# Patient Record
Sex: FEMALE | Race: BLACK OR AFRICAN AMERICAN | NOT HISPANIC OR LATINO | Employment: FULL TIME | URBAN - METROPOLITAN AREA
[De-identification: names, ages, dates, MRNs, and addresses within clinical notes are randomized per-mention and may not be internally consistent; named-entity substitution may affect disease eponyms.]

---

## 2023-05-06 DIAGNOSIS — I10 PRIMARY HYPERTENSION: Primary | ICD-10-CM

## 2023-05-10 RX ORDER — AMLODIPINE BESYLATE 5 MG/1
5 TABLET ORAL DAILY
Qty: 30 TABLET | Refills: 0 | Status: SHIPPED | OUTPATIENT
Start: 2023-05-10 | End: 2023-06-09

## 2025-05-12 ENCOUNTER — HOSPITAL ENCOUNTER (EMERGENCY)
Facility: HOSPITAL | Age: 35
Discharge: HOME | End: 2025-05-12
Attending: STUDENT IN AN ORGANIZED HEALTH CARE EDUCATION/TRAINING PROGRAM
Payer: MEDICARE

## 2025-05-12 ENCOUNTER — APPOINTMENT (OUTPATIENT)
Dept: RADIOLOGY | Facility: HOSPITAL | Age: 35
End: 2025-05-12
Payer: MEDICARE

## 2025-05-12 ENCOUNTER — CLINICAL SUPPORT (OUTPATIENT)
Dept: EMERGENCY MEDICINE | Facility: HOSPITAL | Age: 35
End: 2025-05-12
Payer: MEDICARE

## 2025-05-12 VITALS
HEART RATE: 85 BPM | OXYGEN SATURATION: 99 % | TEMPERATURE: 97.5 F | DIASTOLIC BLOOD PRESSURE: 108 MMHG | RESPIRATION RATE: 16 BRPM | WEIGHT: 185 LBS | SYSTOLIC BLOOD PRESSURE: 189 MMHG | HEIGHT: 67 IN | BODY MASS INDEX: 29.03 KG/M2

## 2025-05-12 DIAGNOSIS — R94.31 T WAVE INVERSION IN EKG: ICD-10-CM

## 2025-05-12 DIAGNOSIS — J06.9 VIRAL UPPER RESPIRATORY TRACT INFECTION: Primary | ICD-10-CM

## 2025-05-12 LAB
ALBUMIN SERPL BCP-MCNC: 3.9 G/DL (ref 3.4–5)
ALP SERPL-CCNC: 51 U/L (ref 33–110)
ALT SERPL W P-5'-P-CCNC: 25 U/L (ref 7–45)
ANION GAP SERPL CALC-SCNC: 13 MMOL/L (ref 10–20)
AST SERPL W P-5'-P-CCNC: 18 U/L (ref 9–39)
ATRIAL RATE: 126 BPM
BASOPHILS # BLD AUTO: 0.04 X10*3/UL (ref 0–0.1)
BASOPHILS NFR BLD AUTO: 0.6 %
BILIRUB SERPL-MCNC: 0.3 MG/DL (ref 0–1.2)
BUN SERPL-MCNC: 12 MG/DL (ref 6–23)
CALCIUM SERPL-MCNC: 9.5 MG/DL (ref 8.6–10.6)
CARDIAC TROPONIN I PNL SERPL HS: <3 NG/L (ref 0–34)
CHLORIDE SERPL-SCNC: 109 MMOL/L (ref 98–107)
CO2 SERPL-SCNC: 23 MMOL/L (ref 21–32)
CREAT SERPL-MCNC: 0.83 MG/DL (ref 0.5–1.05)
EGFRCR SERPLBLD CKD-EPI 2021: >90 ML/MIN/1.73M*2
EOSINOPHIL # BLD AUTO: 0.02 X10*3/UL (ref 0–0.7)
EOSINOPHIL NFR BLD AUTO: 0.3 %
ERYTHROCYTE [DISTWIDTH] IN BLOOD BY AUTOMATED COUNT: 14 % (ref 11.5–14.5)
FLUAV RNA RESP QL NAA+PROBE: NOT DETECTED
FLUBV RNA RESP QL NAA+PROBE: NOT DETECTED
GLUCOSE SERPL-MCNC: 101 MG/DL (ref 74–99)
HCT VFR BLD AUTO: 39.8 % (ref 36–46)
HGB BLD-MCNC: 13 G/DL (ref 12–16)
IMM GRANULOCYTES # BLD AUTO: 0.03 X10*3/UL (ref 0–0.7)
IMM GRANULOCYTES NFR BLD AUTO: 0.4 % (ref 0–0.9)
LYMPHOCYTES # BLD AUTO: 1.7 X10*3/UL (ref 1.2–4.8)
LYMPHOCYTES NFR BLD AUTO: 24.1 %
MAGNESIUM SERPL-MCNC: 1.77 MG/DL (ref 1.6–2.4)
MCH RBC QN AUTO: 26.6 PG (ref 26–34)
MCHC RBC AUTO-ENTMCNC: 32.7 G/DL (ref 32–36)
MCV RBC AUTO: 81 FL (ref 80–100)
MONOCYTES # BLD AUTO: 0.75 X10*3/UL (ref 0.1–1)
MONOCYTES NFR BLD AUTO: 10.6 %
NEUTROPHILS # BLD AUTO: 4.52 X10*3/UL (ref 1.2–7.7)
NEUTROPHILS NFR BLD AUTO: 64 %
NRBC BLD-RTO: 0 /100 WBCS (ref 0–0)
P AXIS: 59 DEGREES
P OFFSET: 187 MS
P ONSET: 167 MS
PLATELET # BLD AUTO: 247 X10*3/UL (ref 150–450)
POTASSIUM SERPL-SCNC: 3.6 MMOL/L (ref 3.5–5.3)
PR INTERVAL: 122 MS
PROT SERPL-MCNC: 7.2 G/DL (ref 6.4–8.2)
Q ONSET: 218 MS
QRS COUNT: 21 BEATS
QRS DURATION: 76 MS
QT INTERVAL: 304 MS
QTC CALCULATION(BAZETT): 440 MS
QTC FREDERICIA: 389 MS
R AXIS: 73 DEGREES
RBC # BLD AUTO: 4.89 X10*6/UL (ref 4–5.2)
SARS-COV-2 RNA RESP QL NAA+PROBE: NOT DETECTED
SODIUM SERPL-SCNC: 141 MMOL/L (ref 136–145)
T AXIS: -24 DEGREES
T OFFSET: 370 MS
VENTRICULAR RATE: 126 BPM
WBC # BLD AUTO: 7.1 X10*3/UL (ref 4.4–11.3)

## 2025-05-12 PROCEDURE — 36415 COLL VENOUS BLD VENIPUNCTURE: CPT | Performed by: STUDENT IN AN ORGANIZED HEALTH CARE EDUCATION/TRAINING PROGRAM

## 2025-05-12 PROCEDURE — 96360 HYDRATION IV INFUSION INIT: CPT

## 2025-05-12 PROCEDURE — 96361 HYDRATE IV INFUSION ADD-ON: CPT

## 2025-05-12 PROCEDURE — 80053 COMPREHEN METABOLIC PANEL: CPT | Performed by: STUDENT IN AN ORGANIZED HEALTH CARE EDUCATION/TRAINING PROGRAM

## 2025-05-12 PROCEDURE — 83735 ASSAY OF MAGNESIUM: CPT

## 2025-05-12 PROCEDURE — 87636 SARSCOV2 & INF A&B AMP PRB: CPT

## 2025-05-12 PROCEDURE — 71046 X-RAY EXAM CHEST 2 VIEWS: CPT | Mod: FOREIGN READ | Performed by: STUDENT IN AN ORGANIZED HEALTH CARE EDUCATION/TRAINING PROGRAM

## 2025-05-12 PROCEDURE — 84484 ASSAY OF TROPONIN QUANT: CPT | Performed by: STUDENT IN AN ORGANIZED HEALTH CARE EDUCATION/TRAINING PROGRAM

## 2025-05-12 PROCEDURE — 85025 COMPLETE CBC W/AUTO DIFF WBC: CPT | Performed by: STUDENT IN AN ORGANIZED HEALTH CARE EDUCATION/TRAINING PROGRAM

## 2025-05-12 PROCEDURE — 71046 X-RAY EXAM CHEST 2 VIEWS: CPT

## 2025-05-12 PROCEDURE — 99285 EMERGENCY DEPT VISIT HI MDM: CPT | Mod: 25 | Performed by: STUDENT IN AN ORGANIZED HEALTH CARE EDUCATION/TRAINING PROGRAM

## 2025-05-12 PROCEDURE — 93005 ELECTROCARDIOGRAM TRACING: CPT

## 2025-05-12 PROCEDURE — 2500000004 HC RX 250 GENERAL PHARMACY W/ HCPCS (ALT 636 FOR OP/ED): Mod: JZ

## 2025-05-12 PROCEDURE — 2500000001 HC RX 250 WO HCPCS SELF ADMINISTERED DRUGS (ALT 637 FOR MEDICARE OP): Performed by: STUDENT IN AN ORGANIZED HEALTH CARE EDUCATION/TRAINING PROGRAM

## 2025-05-12 RX ORDER — ACETAMINOPHEN 325 MG/1
975 TABLET ORAL ONCE
Status: COMPLETED | OUTPATIENT
Start: 2025-05-12 | End: 2025-05-12

## 2025-05-12 RX ORDER — IBUPROFEN 600 MG/1
600 TABLET ORAL EVERY 6 HOURS PRN
Qty: 20 TABLET | Refills: 0 | Status: SHIPPED | OUTPATIENT
Start: 2025-05-12 | End: 2025-05-19

## 2025-05-12 RX ORDER — ACETAMINOPHEN 500 MG
1000 TABLET ORAL EVERY 6 HOURS PRN
Qty: 30 TABLET | Refills: 0 | Status: SHIPPED | OUTPATIENT
Start: 2025-05-12 | End: 2025-05-22

## 2025-05-12 RX ADMIN — SODIUM CHLORIDE 500 ML: 0.9 INJECTION, SOLUTION INTRAVENOUS at 11:06

## 2025-05-12 RX ADMIN — ACETAMINOPHEN 975 MG: 325 TABLET ORAL at 12:12

## 2025-05-12 ASSESSMENT — COLUMBIA-SUICIDE SEVERITY RATING SCALE - C-SSRS
1. IN THE PAST MONTH, HAVE YOU WISHED YOU WERE DEAD OR WISHED YOU COULD GO TO SLEEP AND NOT WAKE UP?: NO
6. HAVE YOU EVER DONE ANYTHING, STARTED TO DO ANYTHING, OR PREPARED TO DO ANYTHING TO END YOUR LIFE?: NO
2. HAVE YOU ACTUALLY HAD ANY THOUGHTS OF KILLING YOURSELF?: NO

## 2025-05-12 ASSESSMENT — PAIN SCALES - GENERAL
PAINLEVEL_OUTOF10: 0 - NO PAIN

## 2025-05-12 ASSESSMENT — PAIN - FUNCTIONAL ASSESSMENT
PAIN_FUNCTIONAL_ASSESSMENT: 0-10
PAIN_FUNCTIONAL_ASSESSMENT: 0-10

## 2025-05-12 ASSESSMENT — LIFESTYLE VARIABLES
TOTAL SCORE: 0
EVER HAD A DRINK FIRST THING IN THE MORNING TO STEADY YOUR NERVES TO GET RID OF A HANGOVER: NO
EVER FELT BAD OR GUILTY ABOUT YOUR DRINKING: NO
HAVE YOU EVER FELT YOU SHOULD CUT DOWN ON YOUR DRINKING: NO
HAVE PEOPLE ANNOYED YOU BY CRITICIZING YOUR DRINKING: NO

## 2025-05-12 NOTE — ED PROVIDER NOTES
EMERGENCY DEPARTMENT ENCOUNTER      Pt Name: Frantz Ann  MRN: [id number]  Birthdate 1990  Date of evaluation: 5/12/2025  Provider: Akila Tanner MD    CHIEF COMPLAINT       Chief Complaint   Patient presents with    URI     HISTORY OF PRESENT ILLNESS    Frantz Ann is a 35 y.o. year old female who presents to the ER for URI symptoms.  The patient reports that she has had rhinorrhea and a cough for the past 3 days.  She reports that whenever she takes a deep breath, she coughs and she feels a rattle in her chest.  The patient also reports that she has had diarrhea for the past 3 days as well and she has had approximately 3 episodes per day.  She reports the stool as nonbloody and nonbilious.  She denies any pain.  She denies UTI symptoms.  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    PMH is significant for hypertension.     PAST MEDICAL HISTORY   Medical History[1]  CURRENT MEDICATIONS       Previous Medications    AMLODIPINE (NORVASC) 5 MG TABLET    Take 1 tablet (5 mg) by mouth once daily. as directed     SURGICAL HISTORY     Surgical History[2]  ALLERGIES     Patient has no known allergies.  FAMILY HISTORY     Family History[3]  SOCIAL HISTORY     Social History[4]  PHYSICAL EXAM  (up to 7 for level 4, 8 or more for level 5)     ED Triage Vitals [05/12/25 1002]   Temperature Heart Rate Respirations BP   36.4 °C (97.5 °F) (!) 132 16 (!) 189/112      Pulse Ox Temp src Heart Rate Source Patient Position   99 % -- -- --      BP Location FiO2 (%)     -- --       Physical Exam  Constitutional:       General: She is not in acute distress.     Appearance: She is obese.   HENT:      Head: Normocephalic and atraumatic.      Nose: Nose normal.      Mouth/Throat:      Mouth: Mucous membranes are moist.   Eyes:      Extraocular Movements: Extraocular movements intact.      Conjunctiva/sclera: Conjunctivae normal.   Cardiovascular:      Rate and Rhythm: Regular  rhythm. Tachycardia present.      Pulses: Normal pulses.      Heart sounds: Normal heart sounds.   Pulmonary:      Effort: Pulmonary effort is normal.      Breath sounds: Normal breath sounds.   Abdominal:      General: Abdomen is flat.      Palpations: Abdomen is soft.      Tenderness: There is no abdominal tenderness. There is no guarding or rebound.      Hernia: No hernia is present.   Musculoskeletal:      Cervical back: Normal range of motion and neck supple.      Right lower leg: No edema.      Left lower leg: No edema.   Skin:     General: Skin is warm and dry.      Capillary Refill: Capillary refill takes less than 2 seconds.   Neurological:      General: No focal deficit present.        DIAGNOSTIC RESULTS   LABS:  Labs Reviewed   COMPREHENSIVE METABOLIC PANEL - Abnormal       Result Value    Glucose 101 (*)     Sodium 141      Potassium 3.6      Chloride 109 (*)     Bicarbonate 23      Anion Gap 13      Urea Nitrogen 12      Creatinine 0.83      eGFR >90      Calcium 9.5      Albumin 3.9      Alkaline Phosphatase 51      Total Protein 7.2      AST 18      Bilirubin, Total 0.3      ALT 25     TROPONIN I, HIGH SENSITIVITY - Normal    Troponin I, High Sensitivity (CMC) <3      Narrative:     Less than 99th percentile of normal range cutoff-  Female and children under 18 years old <35 ng/L; Male <54 ng/L: Negative  Repeat testing should be performed if clinically indicated.     Female and children under 18 years old  ng/L; Male  ng/L:  Consistent with possible cardiac damage and possible increased clinical   risk. Serial measurements may help to assess extent of myocardial damage.     >120 ng/L: Consistent with cardiac damage, increased clinical risk and  myocardial infarction. Serial measurements may help assess extent of   myocardial damage.      NOTE: Children less than 1 year old may have higher baseline troponin   levels and results should be interpreted in conjunction with the overall    clinical context.    NOTE: Troponin I testing is performed using a different   testing methodology at [facility] than at other   [facility]. Direct result comparisons should only   be made within the same method.     MAGNESIUM - Normal    Magnesium 1.77     INFLUENZA A AND B PCR - Normal    Flu A Result Not Detected      Flu B Result Not Detected      Narrative:     This assay is an in vitro diagnostic multiplex nucleic acid amplification test for the detection and discrimination of Influenza A & B from nasopharyngeal specimens, and has been validated for use at [facility]. Negative results do not preclude Influenza A/B infections, and should not be used as the sole basis for diagnosis, treatment, or other management decisions. If Influenza A/B and RSV PCR results are negative, testing for Parainfluenza virus, Adenovirus and Metapneumovirus is routinely performed for [facility] [place] and [place] inpatients, and is available on other patients by placing an add-on request.   SARS-COV-2 PCR - Normal    Coronavirus 2019, PCR Not Detected      Narrative:     This assay is an FDA-cleared, in vitro diagnostic nucleic acid amplification test for the qualitative detection and differentiation of SARS CoV-2 from nasopharyngeal specimens collected from individuals with signs and symptoms of respiratory tract infections, and has been validated for use at [facility]. Negative results do not preclude COVID-19 infections and should not be used as the sole basis for diagnosis, treatment, or other management decisions. Testing for SARS CoV-2 is recommended only for patients who meet current clinical and/or epidemiological criteria defined by federal, state, or local public health directives.   CBC WITH AUTO DIFFERENTIAL    WBC 7.1      nRBC 0.0      RBC 4.89      Hemoglobin 13.0      Hematocrit 39.8      MCV 81      MCH 26.6      MCHC 32.7      RDW  "14.0      Platelets 247      Neutrophils % 64.0      Immature Granulocytes %, Automated 0.4      Lymphocytes % 24.1      Monocytes % 10.6      Eosinophils % 0.3      Basophils % 0.6      Neutrophils Absolute 4.52      Immature Granulocytes Absolute, Automated 0.03      Lymphocytes Absolute 1.70      Monocytes Absolute 0.75      Eosinophils Absolute 0.02      Basophils Absolute 0.04     HUMAN CHORIONIC GONADOTROPIN, SERUM QUANTITATIVE     All other labs were within normal range or not returned as of this dictation.  Imaging  XR chest 2 views   Final Result   No radiographic evidence of acute cardiopulmonary disease.   Signed by Pierre Casillas         Procedure  Procedures  EMERGENCY DEPARTMENT COURSE/MDM:   Medical Decision Making    Vitals:    Vitals:    05/12/25 1002 05/12/25 1131   BP: (!) 189/112 (!) 172/120   Pulse: (!) 132 94   Resp: 16 16   Temp: 36.4 °C (97.5 °F)    SpO2: 99% 100%   Weight: 83.9 kg (185 lb)    Height: 1.702 m (5' 7\")      Frantz Ann is a female 35 y.o. who presents to the ER for URI symptoms. On arrival the patients vital signs were: Afebrile, Tachycardic, Normotensive, and Normoxic on room air. History obtained from: patient.     Physical exam was unremarkable.  The lungs are clear to auscultation, no rales, crackles, wheezing.  On deep inspiration, the patient has a dry cough.  Heart is tachycardic but in regular rhythm.  Pulses are 2+ in the extremities, the abdomen is soft nontender.  No edema in the lower extremities.     On independent assessment of the labs, CBC was within normal limits, no evidence of infection or acute anemia.  CMP was within normal limits, no evidence of electrolyte abnormalities, DASH, or liver dysfunction.  Urinalysis was negative for UTI.  Viral PCR are negative.     Chest x-ray was negative for any acute cardiopulmonary processes.  Due to her increased heart rate, the patient was given a bolus of normal saline.    EKG showed new T wave inversions in V3.  " Patient is in sinus rhythm, NE intervals within normal limits, QT interval is normal.  QRS is narrow.  No ST elevations or depressions.  On repeat EKG, it still shows the T wave inversions in V3.  The rest of the EKG is similar to prior.    The patient's heart score is 1. The patient was discharged with and given return precautions. The patient was instructed to follow up with cardiology and with their PCP in one week. The patient understood and was agreeable with the plan.       ED Course as of 05/12/25 1324   Mon May 12, 2025   1053 External record review  4/20/25, 12/23/2024 : seen at [facility] for medroxyprogesterone  [FN]   1115 We considered CTA/Dimer to evaluate for PE however the patient's history and physical exam are not consistent with pulmonary embolism given no unilateral leg swelling/pain, hemoptysis, recent immobilization, active cancer.  Additionally there was an alternate more likely explanation for the patient's symptoms (URI/infection). Therefore additional testing for this diagnosis was not pursued due to its low clinical utility.   [AD]   1201 Senior resident attestation note:    35-year-old female with prior history of hypertension presenting to emergency department with upper respiratory symptoms, and nonproductive cough since Friday.  She currently works at a  with other children and employees who are sick, and believes she may have something similar.  Her COVID, influenza, and RSV swabs were all unremarkable.  Her lab work did not show any acute findings.  She notes decreased p.o. intake, likely the cause of patient's tachycardia today.  Improved with IV fluids.  EKG grossly unremarkable without any acute ischemic findings.  She is not having any chest pain or shortness of breath.  Have low suspicion for pulmonary embolism given lack of tachycardia, shortness of breath, hypoxia, or any known risk factors such as oral birth control, pregnancy, or history of DVT/PE.  Will plan  to repeat EKG given new T waves in the inferior leads.  If resolved, will plan to discharge patient home with Tylenol and ibuprofen.    Octavio Gan DO  [facility]   [AD]      ED Course User Index  [AD] Octavio Gan DO  [FN] Rhonda Villegas MD         Diagnoses as of 05/12/25 1324   Viral upper respiratory tract infection   T wave inversion in EKG       External Records Reviewed: I reviewed recent and relevant outside records including inpatient notes, outpatient records      Shared decision making for disposition  Patient and/or patient´s representative was counseled regarding labs, imaging, likely diagnosis. All questions were answered. Recommendation was made   for discharge home. The patient agreed and was discharged home in stable condition with appropriate relevant educational materials. Return precautions were provided which included new or worsening chest pain, shortness of breath, fever of 38C (100.4) or higher, persistent vomiting, weakness, numbness, tingling, excessive sweating,  loss of motion in your arms or legs, fainting, vision changes, or any new or worsening symptoms..     ED Medications administered this visit:    Medications   sodium chloride 0.9 % bolus 500 mL (0 mL intravenous Stopped 5/12/25 1307)   acetaminophen (Tylenol) tablet 975 mg (975 mg oral Given 5/12/25 1212)       New Prescriptions from this visit:    New Prescriptions    ACETAMINOPHEN (TYLENOL) 500 MG TABLET    Take 2 tablets (1,000 mg) by mouth every 6 hours if needed for mild pain (1 - 3) for up to 10 days.    IBUPROFEN 600 MG TABLET    Take 1 tablet (600 mg) by mouth every 6 hours if needed for mild pain (1 - 3) for up to 7 days.       Follow-up:  Neela Ambriz MD  [address]  [facility], [facility] [address]  [facility] [address] [phone]    In 1 week          Final Impression:   1. Viral upper respiratory tract infection    2. T  wave inversion in EKG          Please excuse any misspellings or unintended errors related to the Dragon speech recognition software used to dictate this note.    I reviewed the case with the attending ED physician. The attending ED physician agrees with the plan.        Akila Tanner MD  Resident  [phone]       [1]   Past Medical History:  Diagnosis Date    Maternal care for (suspected) hereditary disease in fetus, not applicable or unspecified ([facility]) 2019    Hereditary disease in family possibly affecting fetus    Maternal care for unspecified type scar from previous  delivery ([facility]) 2019    Uterine scar from previous  delivery affecting pregnancy    Morbid (severe) obesity due to excess calories ([facility]) 2019    Obesity, Class III, BMI 40-49.9 (morbid obesity)    Other abnormal glucose 2019    Elevated glucose tolerance test    Personal history of other diseases of the circulatory system 2016    History of hypertension    Unspecified pre-existing hypertension complicating pregnancy, unspecified trimester ([facility]) 2019    HTN in pregnancy, chronic   [2]   Past Surgical History:  Procedure Laterality Date     SECTION, CLASSIC  2016     Section    OTHER SURGICAL HISTORY  2015    Laparoscopic Cholecystectomy With Cholangiography   [3] No family history on file.  [4]   Social History  Tobacco Use    Smoking status: Not on file    Smokeless tobacco: Not on file   Substance Use Topics    Alcohol use: Not on file    Drug use: Not on file        Akila Tanner MD  Resident  [phone]

## 2025-05-12 NOTE — DISCHARGE INSTRUCTIONS
You are seen in the ED for your cold symptoms, your labs are normal, your chest x-ray was, no pneumonia.  Please stay hydrated with Gatorade, take cold and flu medication for your symptoms, Tylenol for fevers and ibuprofen for any kind of muscle pain.    Please return to the ED if you do not get better in 1 week, if you have worsening symptoms, if you cannot swallow, if you have dizziness that does not go away, fainting spells, confusions, fevers greater than 101 °F for more than 24 hours that does not get better with Tylenol or Motrin, and if you have any other concerns.    Please make an appointment with the cardiologist when they reach out to you to follow-up your high blood pressure and your abnormal EKG.

## 2025-05-12 NOTE — Clinical Note
Frantz Ann was seen and treated in our emergency department on 5/12/2025.  She may return to work on 05/14/2025.       If you have any questions or concerns, please don't hesitate to call.      Akila Tanner MD

## 2025-05-28 PROBLEM — N92.1 BREAKTHROUGH BLEEDING ON DEPO-PROVERA: Status: ACTIVE | Noted: 2025-05-28

## 2025-05-28 PROBLEM — D56.3 ALPHA THALASSAEMIA MINOR: Status: ACTIVE | Noted: 2025-05-28

## 2025-05-28 PROBLEM — I10 ESSENTIAL HYPERTENSION: Status: ACTIVE | Noted: 2025-05-28

## 2025-05-28 PROBLEM — E55.9 VITAMIN D DEFICIENCY: Status: ACTIVE | Noted: 2025-05-28

## 2025-06-14 ENCOUNTER — APPOINTMENT (OUTPATIENT)
Dept: RADIOLOGY | Facility: HOSPITAL | Age: 35
End: 2025-06-14
Payer: COMMERCIAL

## 2025-06-14 ENCOUNTER — HOSPITAL ENCOUNTER (EMERGENCY)
Facility: HOSPITAL | Age: 35
Discharge: HOME | End: 2025-06-14
Attending: EMERGENCY MEDICINE
Payer: COMMERCIAL

## 2025-06-14 VITALS
TEMPERATURE: 98 F | HEART RATE: 94 BPM | WEIGHT: 240 LBS | BODY MASS INDEX: 37.67 KG/M2 | RESPIRATION RATE: 18 BRPM | HEIGHT: 67 IN | SYSTOLIC BLOOD PRESSURE: 199 MMHG | OXYGEN SATURATION: 100 % | DIASTOLIC BLOOD PRESSURE: 119 MMHG

## 2025-06-14 DIAGNOSIS — M25.562 ACUTE PAIN OF LEFT KNEE: Primary | ICD-10-CM

## 2025-06-14 DIAGNOSIS — I10 HTN (HYPERTENSION), BENIGN: ICD-10-CM

## 2025-06-14 PROCEDURE — 99283 EMERGENCY DEPT VISIT LOW MDM: CPT | Performed by: EMERGENCY MEDICINE

## 2025-06-14 PROCEDURE — 73564 X-RAY EXAM KNEE 4 OR MORE: CPT | Mod: LT

## 2025-06-14 PROCEDURE — 73564 X-RAY EXAM KNEE 4 OR MORE: CPT | Mod: LEFT SIDE | Performed by: RADIOLOGY

## 2025-06-14 RX ORDER — ACETAMINOPHEN 500 MG
1000 TABLET ORAL EVERY 6 HOURS PRN
Qty: 30 TABLET | Refills: 0 | Status: SHIPPED | OUTPATIENT
Start: 2025-06-14 | End: 2025-06-24

## 2025-06-14 RX ORDER — IBUPROFEN 600 MG/1
600 TABLET, FILM COATED ORAL EVERY 6 HOURS PRN
Qty: 40 TABLET | Refills: 0 | Status: SHIPPED | OUTPATIENT
Start: 2025-06-14 | End: 2025-06-24

## 2025-06-14 RX ORDER — BLOOD PRESSURE TEST KIT-MEDIUM
1 KIT MISCELLANEOUS ONCE
Qty: 1 KIT | Refills: 0 | Status: SHIPPED | OUTPATIENT
Start: 2025-06-14 | End: 2025-06-14

## 2025-06-14 RX ORDER — KETOROLAC TROMETHAMINE 30 MG/ML
15 INJECTION, SOLUTION INTRAMUSCULAR; INTRAVENOUS ONCE
Status: DISCONTINUED | OUTPATIENT
Start: 2025-06-14 | End: 2025-06-14 | Stop reason: HOSPADM

## 2025-06-14 ASSESSMENT — PAIN DESCRIPTION - ORIENTATION: ORIENTATION: LEFT

## 2025-06-14 ASSESSMENT — PAIN SCALES - GENERAL: PAINLEVEL_OUTOF10: 10 - WORST POSSIBLE PAIN

## 2025-06-14 ASSESSMENT — PAIN DESCRIPTION - FREQUENCY: FREQUENCY: CONSTANT/CONTINUOUS

## 2025-06-14 ASSESSMENT — PAIN DESCRIPTION - LOCATION: LOCATION: KNEE

## 2025-06-14 ASSESSMENT — PAIN DESCRIPTION - DESCRIPTORS
DESCRIPTORS: ACHING
DESCRIPTORS: ACHING

## 2025-06-14 ASSESSMENT — PAIN DESCRIPTION - PROGRESSION: CLINICAL_PROGRESSION: NOT CHANGED

## 2025-06-14 ASSESSMENT — PAIN - FUNCTIONAL ASSESSMENT: PAIN_FUNCTIONAL_ASSESSMENT: 0-10

## 2025-06-14 ASSESSMENT — PAIN DESCRIPTION - PAIN TYPE: TYPE: ACUTE PAIN

## 2025-06-14 ASSESSMENT — PAIN DESCRIPTION - ONSET: ONSET: SUDDEN

## 2025-06-14 NOTE — ED TRIAGE NOTES
Pt states she was dancing at 22:00 last night and heard pop in left knee and now having pain. No falls or noticeable trauma. No swelling or redness noted.

## 2025-06-14 NOTE — ED PROVIDER NOTES
History of Present Illness   History provided by: Patient  Limitations to History: None  External Records Reviewed with Brief Summary: None    HPI:  Frantz Ann is a 35 y.o. female with a past medical history of hypertension that presents to the emergency department today for left knee pain.  The patient states that she was dancing last night with her kids and heard a pop in her left knee.  She did not have any significant pain at that time but woke this morning and had difficulty ambulating on her left leg secondary to the pain.  She denies any significant swelling or overlying redness and has not had any fevers associated with this.  She does have a history of hypertension but did not take her amlodipine this morning but has been taking this as prescribed.  She states that her blood pressure has normally been well-controlled.  She denies any headaches, vision changes, difficulty with urination or any other concerning symptoms regarding her hypertension.    Physical Exam   Triage vitals:  T 36.7 °C (98 °F)  HR 94  BP (!) 199/119  RR 18  O2 100 % None (Room air)    Vital signs reviewed in nursing triage note, EMR flow sheets, and at patient's bedside.   Constitutional: Well developed adult in no acute distress, non toxic in appearance  Head: Normocephalic, atraumatic  Skin: Intact. No rashes or lesions.  Eyes: No conjunctival injection, scleral icterus, or drainage.  Neck: Supple. Trachea is midline.  Pulmonary: Normal work of breathing without signs of respiratory distress.  Extremities: No gross deformities. Moving all extremities spontaneously without difficulty.  Full range of motion of the left knee but does have pain at the end of extension.  No tenderness to palpation of the left knee.  No significant swelling appreciated.  No overlying erythema or warmth.  Neuro: Awake and alert. Face is symmetric.  Speech is clear. No obvious focal findings.  Psych: Appropriate affect    Medical Decision Making &  ED Course   Medical Decision Makin y.o. female with the above-stated past medical history that presents to the emergency department for left knee pain.  Upon arrival to the emergency department the patient was hypertensive with blood pressure 199/119 but had otherwise stable vital signs, is afebrile and saturating well on room air.  History and physical exam are as above but notable for nontoxic-appearing patient in no acute distress.  She does have full range of motion of the left knee with mild pain at the end of full extension.  There is no tenderness to palpation no overlying erythema or warmth and have low suspicion for septic arthritis.  Her symptoms are most likely secondary to a left knee sprain but will obtain an x-ray of the left knee to rule out underlying bony fracture or significant effusion although I have low suspicion for this.  Will treat her with a dose of Toradol and reassess her symptoms.  In regards to her hypertension, she did not take her amlodipine this morning and she is not having any symptoms concerning for hypertensive emergency and will not treat this as this could be secondary to pain and her missed medication.  She does have a follow-up with her cardiologist on  and requested her to recheck her blood pressure daily after taking her medication to ensure that she is on the proper dosing between then or now and follow-up with her cardiologist on this.  X-ray of the left knee did not show any evidence of acute fracture or other concerning bony abnormalities and the patient feels comfortable with discharging home.  She was provided with an Ace wrap for her knee and a prescription for Tylenol and ibuprofen which has been sent to her pharmacy.  She is provided with strict return precautions including fevers, erythema, significant knee swelling, inability to ambulate on the knee or any other concerning symptoms.  She verbalized her understanding of this and was discharged home in  stable condition.    ED Course:  ED Course as of 06/14/25 1004   Sat Jun 14, 2025   0931 XR knee left 4+ views  Left knee x-ray personally interpreted by me showing no acute fracture, joint space loss or significant effusion.  Normal left knee x-ray. [RS]      ED Course User Index  [RS] Derrick Huang DO         Diagnoses as of 06/14/25 1004   Acute pain of left knee   HTN (hypertension), benign        Social Determinants of Health which Significantly Impact Care: None identified     EKG Independent Interpretation: EKG not obtained    Independent Result Review and Interpretation: Relevant laboratory and radiographic results were reviewed and independently interpreted by myself.  As necessary, they are commented on in the ED Course.    Chronic conditions affecting the patient's care: As documented in the HPI    The patient was discussed with the following consultants/services: None    Care Considerations: As documented above in MDM    Disposition   As a result of the work-up, the patient was discharged home.  she was informed of her diagnosis and instructed to come back with any concerns or worsening of condition.  she and was agreeable to the plan as discussed above.  she was given the opportunity to ask questions.  All of the patient's questions were answered.    Procedures   Procedures    Patient seen and discussed with ED attending physician.    Derrick Huang DO   Emergency Medicine, PGY-2     Disclaimer: This note was dictated by speech recognition. Minor errors in transcription may be present.     [unfilled] ? [employer] last updated 6/14/2025 8:56 AM        Derrick Huang DO  Resident  06/14/25 1004

## 2025-06-14 NOTE — DISCHARGE INSTRUCTIONS
You were seen in the emergency department today for left knee pain.  Your x-ray did not show any findings of fracture or other concerning bony abnormalities.  This is likely a knee sprain and you can take ibuprofen and Tylenol as needed for your pain.  A referral for sports medicine has been placed for you and you can follow-up with them as needed for further management of your knee pain.  Your blood pressure was also elevated here in the ER and you have a scheduled follow-up with your cardiologist on the 19th and you can discuss any changes in your medication but are encouraged to take your amlodipine when you get home today.  Please return to the emergency department if you have significant swelling of your knee, redness, fevers or unable to walk.  You can use an Ace wrap to help support your knee at home and one will be provided for you today.

## 2025-06-19 ENCOUNTER — APPOINTMENT (OUTPATIENT)
Dept: CARDIOLOGY | Facility: CLINIC | Age: 35
End: 2025-06-19
Payer: MEDICARE

## 2025-06-19 VITALS
OXYGEN SATURATION: 100 % | BODY MASS INDEX: 47.46 KG/M2 | HEART RATE: 91 BPM | WEIGHT: 293 LBS | DIASTOLIC BLOOD PRESSURE: 88 MMHG | SYSTOLIC BLOOD PRESSURE: 156 MMHG

## 2025-06-19 DIAGNOSIS — I10 ESSENTIAL HYPERTENSION: Primary | ICD-10-CM

## 2025-06-19 DIAGNOSIS — J06.9 VIRAL UPPER RESPIRATORY TRACT INFECTION: ICD-10-CM

## 2025-06-19 DIAGNOSIS — I10 PRIMARY HYPERTENSION: ICD-10-CM

## 2025-06-19 PROCEDURE — 99202 OFFICE O/P NEW SF 15 MIN: CPT | Performed by: PHYSICIAN ASSISTANT

## 2025-06-19 PROCEDURE — 3079F DIAST BP 80-89 MM HG: CPT | Performed by: PHYSICIAN ASSISTANT

## 2025-06-19 PROCEDURE — 3077F SYST BP >= 140 MM HG: CPT | Performed by: PHYSICIAN ASSISTANT

## 2025-06-19 PROCEDURE — 99204 OFFICE O/P NEW MOD 45 MIN: CPT | Performed by: PHYSICIAN ASSISTANT

## 2025-06-19 RX ORDER — AMLODIPINE BESYLATE 10 MG/1
10 TABLET ORAL DAILY
Qty: 30 TABLET | Refills: 11 | Status: SHIPPED | OUTPATIENT
Start: 2025-06-19 | End: 2025-07-19

## 2025-06-19 NOTE — PROGRESS NOTES
Chief Complaint:   Abnormal EKG per ED     History Of Present Illness:    Frantz Ann is a 35 y.o. female presenting with reported EKG abnormalities.  Patient recently presented to ED with URI symptoms, an EKG was completed without ischemic findings in my review of studies.  Patient remains hypertensive despite treatment with amlodipine 5mg daily.  Patient denies chest pain, chest pressure, palpitations, dyspnea on exertion, shortness of breath at rest, diaphoresis, nausea/vomiting, back pain, headache, lightheadedness, dizziness, syncope or presyncopal episodes, active bleeding signs or symptoms, excessive weight gain, muscle or joint pain, claudication.       Last Recorded Vitals:  Vitals:    25 0917   BP: 156/88   BP Location: Left arm   Patient Position: Sitting   Pulse: 91   SpO2: 100%   Weight: 137 kg (303 lb)       Past Medical History:  She has a past medical history of Maternal care for (suspected) hereditary disease in fetus, not applicable or unspecified ([facility]) (2019), Maternal care for unspecified type scar from previous  delivery ([facility]) (2019), Morbid (severe) obesity due to excess calories ([facility]) (2019), Other abnormal glucose (2019), Personal history of other diseases of the circulatory system (2016), and Unspecified pre-existing hypertension complicating pregnancy, unspecified trimester ([facility]) (2019).    Past Surgical History:  She has a past surgical history that includes Other surgical history (2015) and  section, classic (2016).      Social History:  She has no history on file for tobacco use, alcohol use, and drug use.    Family History:  Family History[1]     Allergies:  Patient has no known allergies.    Outpatient Medications:  Current Outpatient Medications   Medication Instructions    acetaminophen (TYLENOL) 1,000 mg, oral, Every 6 hours PRN    amLODIPine (NORVASC) 5 mg, oral, Daily, as directed     "ibuprofen 600 mg, oral, Every 6 hours PRN       Physical Exam:  Constitutional: awake and alert, oriented ×3, no apparent distress  Skin: warm, dry, good turgor no obvious lesions  Eyes: pupils equal, round, reactive to light, conjunctiva pink and noninjected, no discharge  HENT: normocephalic and atraumatic, mucous membranes moist, trachea midline with no masses/goiter  Cardiovascular: S1/S2 regular, no murmur no rubs/gallops, no carotid bruits, no JVD  Pulmonary: symmetrical chest expansion, lungs are clear to auscultation bilaterally, no wheezes/rales/rhonchi, normal effort  Abdomen: nontender, nondistended, active bowel sounds, no ascites  Extremities: no cyanosis, clubbing, no LE edema no lesions; palpable pedal pulses  Neurologic: cranial nerves II - XII grossly intact, stable gait, no tremor       Last Labs:  CBC -  Lab Results   Component Value Date    WBC 7.1 05/12/2025    HGB 13.0 05/12/2025    HCT 39.8 05/12/2025    MCV 81 05/12/2025     05/12/2025       CMP -  Lab Results   Component Value Date    CALCIUM 9.5 05/12/2025    PROT 7.2 05/12/2025    ALBUMIN 3.9 05/12/2025    AST 18 05/12/2025    ALT 25 05/12/2025    ALKPHOS 51 05/12/2025    BILITOT 0.3 05/12/2025       LIPID PANEL -   No results found for: \"CHOL\", \"TRIG\", \"HDL\", \"CHHDL\", \"LDLF\", \"VLDL\", \"NHDL\"    RENAL FUNCTION PANEL -   Lab Results   Component Value Date    GLUCOSE 101 (H) 05/12/2025     05/12/2025    K 3.6 05/12/2025     (H) 05/12/2025    CO2 23 05/12/2025    ANIONGAP 13 05/12/2025    BUN 12 05/12/2025    CREATININE 0.83 05/12/2025    CALCIUM 9.5 05/12/2025    ALBUMIN 3.9 05/12/2025        Lab Results   Component Value Date    HGBA1C 5.3 11/21/2018       Last Cardiology Tests:  ECG:  ECG 12 lead 05/12/2025      Echo:  No results found for this or any previous visit from the past 1095 days.      Ejection Fractions:  No results found for: \"EF\"    Cath:  No results found for this or any previous visit from the past 1095 " days.      Stress Test:  No results found for this or any previous visit from the past 1095 days.      Cardiac Imaging:  No results found for this or any previous visit from the past 1095 days.      Assessment/Plan   Problem List Items Addressed This Visit           ICD-10-CM       Cardiac and Vasculature    Essential hypertension - Primary I10    Relevant Orders    Referral to Primary Care     Other Visit Diagnoses         Codes      Viral upper respiratory tract infection     J06.9      Primary hypertension     I10    Relevant Medications    amLODIPine (Norvasc) 10 mg tablet            -I do not appreciate any concerning/ischemic findings on EKG    -Increase amlodipine to 10mg daily    RTC 3 months      Seth Barajas PA-C         [1] No family history on file.

## 2025-07-02 ENCOUNTER — OFFICE VISIT (OUTPATIENT)
Dept: ORTHOPEDIC SURGERY | Facility: HOSPITAL | Age: 35
End: 2025-07-02
Payer: COMMERCIAL

## 2025-07-02 DIAGNOSIS — M17.10 PATELLOFEMORAL ARTHROSIS: Primary | ICD-10-CM

## 2025-07-02 DIAGNOSIS — M25.562 ACUTE PAIN OF LEFT KNEE: ICD-10-CM

## 2025-07-02 PROCEDURE — 99212 OFFICE O/P EST SF 10 MIN: CPT | Performed by: PHYSICIAN ASSISTANT

## 2025-07-02 RX ORDER — MELOXICAM 15 MG/1
15 TABLET ORAL DAILY
Qty: 14 TABLET | Refills: 0 | Status: SHIPPED | OUTPATIENT
Start: 2025-07-02 | End: 2025-07-16

## 2025-07-02 ASSESSMENT — PAIN - FUNCTIONAL ASSESSMENT: PAIN_FUNCTIONAL_ASSESSMENT: 0-10

## 2025-07-02 ASSESSMENT — PAIN SCALES - GENERAL: PAINLEVEL_OUTOF10: 2

## 2025-07-02 NOTE — PROGRESS NOTES
HPI  This is a pleasant 35 y.o. female here today for left knee pain.  The pain started a few weeks ago. She was dancing and she felt a pop in the knee.  She denies any significant swelling.  No instability but had pain with walking.  Went to ED.  Told to take Ibuprofen and follow up with ortho.  Her pain has improved. She still has discomfort getting out of bed and with stairs.  Current pain gets up to a 5/10.        Medical History[1]    Surgical History[2]    Social History[3]        ROS  Reviewed and all pertinent positives mentioned in HPI.      EXAM  Patient is in no acute distress.  They are alert and oriented x3.  They are of normal mood and affect.  They are in no acute distress.  The patient's limb is warm and well-perfused.  They have intact sensation to light touch in all lower extremity dermatomes.  There is a minimal effusion.    The patient's quadriceps and hamstring strength is 5 of 5.    The patient can do a straight leg raise with no radicular pain.  negative lachmans. negative posterior drawer.  There is 1+ patellofemoral crepitus.   The knee is stable to varus and valgus stress at both 0 and 30°.  There is no tenderness along the medial or lateral joint line.  negative McMurrays      IMAGING  Imaging reviewed today reveal no gross fracture or dislocation.    MRI reviewed reveals No MRI for review      ASSESSMENT/PLAN  Patient with left patellofemoral arthrosis    We will have the patient initiate a course of physical therapy and continue NSAIDs and activity modification.  If symptoms persist, we will see them back for re-evaluation.          Debora Gill PA-C'         [1]   Past Medical History:  Diagnosis Date    Maternal care for (suspected) hereditary disease in fetus, not applicable or unspecified ([facility]) 2019    Hereditary disease in family possibly affecting fetus    Maternal care for unspecified type scar from previous  delivery ([facility]) 2019    Uterine scar from  previous  delivery affecting pregnancy    Morbid (severe) obesity due to excess calories ([facility]) 2019    Obesity, Class III, BMI 40-49.9 (morbid obesity)    Other abnormal glucose 2019    Elevated glucose tolerance test    Personal history of other diseases of the circulatory system 2016    History of hypertension    Unspecified pre-existing hypertension complicating pregnancy, unspecified trimester ([facility]) 2019    HTN in pregnancy, chronic   [2]   Past Surgical History:  Procedure Laterality Date     SECTION, CLASSIC  2016     Section    OTHER SURGICAL HISTORY  2015    Laparoscopic Cholecystectomy With Cholangiography   [3]

## 2025-09-24 ENCOUNTER — APPOINTMENT (OUTPATIENT)
Dept: CARDIOLOGY | Facility: CLINIC | Age: 35
End: 2025-09-24
Payer: MEDICARE